# Patient Record
Sex: MALE | Race: WHITE | NOT HISPANIC OR LATINO | URBAN - METROPOLITAN AREA
[De-identification: names, ages, dates, MRNs, and addresses within clinical notes are randomized per-mention and may not be internally consistent; named-entity substitution may affect disease eponyms.]

---

## 2021-11-10 ENCOUNTER — APPOINTMENT (RX ONLY)
Dept: URBAN - METROPOLITAN AREA OTHER 5 | Facility: OTHER | Age: 61
Setting detail: DERMATOLOGY
End: 2021-11-10

## 2021-11-10 DIAGNOSIS — Z41.1 ENCOUNTER FOR COSMETIC SURGERY: ICD-10-CM

## 2021-11-10 PROCEDURE — ? OTHER

## 2021-11-10 PROCEDURE — ? PATIENT SPECIFIC COUNSELING

## 2021-11-10 PROCEDURE — ? CONSULTATION - AGING FACE

## 2021-11-10 PROCEDURE — ? BOTOX

## 2021-11-10 ASSESSMENT — LOCATION DETAILED DESCRIPTION DERM: LOCATION DETAILED: LEFT INFERIOR CENTRAL MALAR CHEEK

## 2021-11-10 ASSESSMENT — LOCATION SIMPLE DESCRIPTION DERM: LOCATION SIMPLE: LEFT CHEEK

## 2021-11-10 ASSESSMENT — LOCATION ZONE DERM: LOCATION ZONE: FACE

## 2021-11-10 NOTE — PROCEDURE: BOTOX
Glabellar Complex Units: 15
Forehead Units: 10
Lot #: [id number]
Dilution (U/0.1 Cc): 2.2
Consent: Written consent was obtained prior to the procedure. Risks, benefits, expectations and alternatives were discussed including, but not limited to, infection, bleeding, lid/brow ptosis, bruising, swelling, diplopia, temporary effects, incomplete chemical denervation and dissatisfaction with the cosmetic outcome. No guarantee or warranty was given or implied regarding longevity of results.
Additional Area 1 Location: crow’s feet
Expiration Date (Month Year): 11/2023
Show Price In Note?: yes
Periorbital Skin Units: 0
Map Statment: See attached map for complete details
Detail Level: Detailed
Price Per Unit In $ (Use Numbers Only, No Text Please.): 17
Bill Summary Price Listed Below, Or Bill Total Of Units X Price Per Unit?: Bill #Units x Price Per Unit
Reconstitution Date: 11/2021
Postcare Instructions: Patient instructed to not lie down for 4 hours and limit physical activity for 24 hours. Patient instructed not to travel by airplane for 48 hours.
Hemigard Intro: Due to skin fragility and wound tension, it was decided to use HEMIGARD adhesive retention suture devices to permit a linear closure. The skin was cleaned and dried for a 6cm distance away from the wound. Excessive hair, if present, was removed to allow for adhesion.

## 2021-11-10 NOTE — PROCEDURE: PATIENT SPECIFIC COUNSELING
Other (Free Text): Dr. Ellis Cherry verbal dictation:\\n\\n  He is a previous patient of mine. He’s here for Botox and he would like to discuss a facelift. In the past I have done a neck lift on him. He has a full barrett so I showed him where the possible incisions would be. He would like to think about the facelift.\\n\\nI prepped his skin and injected 7.5 units of Botox to each crows foot , 5 units to each  , 5 units to each frontalis and 5  units to the procerus
Detail Level: Zone

## 2021-11-10 NOTE — PROCEDURE: CONSULTATION - AGING FACE
Detail Level: Detailed
Consultation Charge $ (Use Numbers Only, No Text Please.): 0
Count Minor/Major Decisions Toward Mdm (Not Cosmetic)?: No

## 2021-11-10 NOTE — HPI: FACE (AGING FACE)
Is This A New Presentation, Or A Follow-Up?: Follow Up Aging Face
Additional History: Patient presents today for consultation- Face/ neck lift\\nBotox\\n

## 2021-12-22 ENCOUNTER — APPOINTMENT (RX ONLY)
Dept: URBAN - METROPOLITAN AREA OTHER 5 | Facility: OTHER | Age: 61
Setting detail: DERMATOLOGY
End: 2021-12-22

## 2021-12-22 DIAGNOSIS — Z41.1 ENCOUNTER FOR COSMETIC SURGERY: ICD-10-CM

## 2021-12-22 PROCEDURE — ? PATIENT SPECIFIC COUNSELING

## 2021-12-22 PROCEDURE — ? BOTOX

## 2021-12-22 ASSESSMENT — LOCATION SIMPLE DESCRIPTION DERM: LOCATION SIMPLE: LEFT CHEEK

## 2021-12-22 ASSESSMENT — LOCATION ZONE DERM: LOCATION ZONE: FACE

## 2021-12-22 ASSESSMENT — LOCATION DETAILED DESCRIPTION DERM: LOCATION DETAILED: LEFT CENTRAL MALAR CHEEK

## 2021-12-22 NOTE — PROCEDURE: PATIENT SPECIFIC COUNSELING
Detail Level: Zone
Other (Free Text): Dr. Ellis Cherry verbal dictation:\\n\\nHe would like Botox. I marked him ,prepped his skin , and injected each cross foot with 5 units, each  with 7.5 units , 5  units to the procerus , 5 units to each frontalis.He said that this was more painful than any of his previous Botox injections.

## 2024-02-24 NOTE — PROCEDURE: BOTOX
Forehead Units: 15
Lot #: [id number]
Glabellar Complex Units: 10
Dilution (U/0.1 Cc): 2.2
Additional Area 3 Units: 0
Bill Summary Price Listed Below, Or Bill Total Of Units X Price Per Unit?: Bill #Units x Price Per Unit
Expiration Date (Month Year): 02/2024
Consent: Written consent was obtained prior to the procedure. Risks, benefits, expectations and alternatives were discussed including, but not limited to, infection, bleeding, lid/brow ptosis, bruising, swelling, diplopia, temporary effects, incomplete chemical denervation and dissatisfaction with the cosmetic outcome. No guarantee or warranty was given or implied regarding longevity of results.
Additional Area 1 Location: crow’s feet
Show Price In Note?: yes
Detail Level: Detailed
Price Per Unit In $ (Use Numbers Only, No Text Please.): 17
Postcare Instructions: Patient instructed to not lie down for 4 hours and limit physical activity for 24 hours. Patient instructed not to travel by airplane for 48 hours.
Map Statment: See attached map for complete details
Reconstitution Date: 12/2021
show